# Patient Record
Sex: MALE
[De-identification: names, ages, dates, MRNs, and addresses within clinical notes are randomized per-mention and may not be internally consistent; named-entity substitution may affect disease eponyms.]

---

## 2018-06-20 ENCOUNTER — HOSPITAL ENCOUNTER (EMERGENCY)
Dept: HOSPITAL 42 - ED | Age: 23
Discharge: HOME | End: 2018-06-20
Payer: COMMERCIAL

## 2018-06-20 VITALS
TEMPERATURE: 98.7 F | SYSTOLIC BLOOD PRESSURE: 118 MMHG | DIASTOLIC BLOOD PRESSURE: 42 MMHG | OXYGEN SATURATION: 98 % | HEART RATE: 72 BPM | RESPIRATION RATE: 18 BRPM

## 2018-06-20 DIAGNOSIS — H66.92: Primary | ICD-10-CM

## 2018-06-20 NOTE — ED PDOC
Arrival/HPI





- General


Chief Complaint: ENT Problem


Time Seen by Provider: 06/20/18 17:55


Historian: Patient





- History of Present Illness


Narrative History of Present Illness (Text): 





06/20/18 17:56


pt p/w + 3-4 days onset of left ear pain, decreasing hearing; [place] the 

be[place] and swimming 4 days ago; pt states he has been using OTC ear wax removal 

with minimum improvement; pt also noted mild sore throat; pt denied fever/chills

/sweats, no chest pain/shortness of breath/palpitations, no abd pain, no n/v, 

no numbness/tingling, no dizziness/LOC, no neck pain, no headache, no ear 

discharge noted; pt denied Fall/trauma/sick contact, no travel; incidentally, 

pt states he has been having intermittent hoarse coughing x 2 weeks/with nasal 

congestion/post nasal drips; pt is here for further eval; pt's without other 

complaints





PCP: NONE








Time/Duration: < week (3-4 days)


Symptom Onset: Sudden


Symptom Course: Worsening


Severity Level: Severe


Activities at Onset: Rest


Context: Home





Past Medical History





- Provider Review


Nursing Documentation Reviewed: Yes





- Travel History


Have you recently traveled outside [place] w/in the past 3 mons?: No





- Past History


Past History: Non-Contributing





- Infectious Disease


Hx of Infectious Diseases: None





- Psychiatric


Hx Substance Use: Yes





Family/Social History





- Physician Review


Nursing Documentation Reviewed: Yes


Family/Social History: No Known Family HX


Smoking Status: Never Smoked


Hx Alcohol Use: No


Frequency of alcohol use: Socially


Hx Substance Use: Yes


Substance used: Marijuana


Hx Substance Use Treatment: No





Allergies/Home Meds


Allergies/Adverse Reactions: 


Allergies





No Known Allergies Allergy (Verified 06/20/18 17:21)


 











Review of Systems





- Review of Systems


Constitutional: Normal


Eyes: Normal


ENT: Hearing Changes, Other (left ear pain)


Respiratory: SOB, Cough.  absent: Sputum, Wheezing


Cardiovascular: Normal


Gastrointestinal: Normal.  absent: Nausea, Vomiting


Genitourinary Male: Normal


Musculoskeletal: Normal


Skin: Normal


Neurological: Normal


Endocrine: Normal


Hemo/Lymphatic: Normal


Psychiatric: Normal





Physical Exam





- Physical Exam


Narrative Physical Exam (Text): 





06/20/18 17:57


General: alert/awake, GCS = 15, oriented x 3, resting in bed, mildly 

uncomfortable, cooperative, interactive; NAD


Head: NC/AT


EYE: PERRLA, EOMI, sclera anicteric, no nystagmus, no photophobia; visual field 

intact b/l


Facial: WNL


ENT: difficult to visualize left TM, + cerumen with mild surrounding otic canal 

erythema, + tenderness noted on otic exam; right TM WNL, no bulging/retractions/

air-fluid level noted; no FB/masses noted; no mastoid tenderness/bogginess noted


Oral: uvula/tongue are midline, no exudate/lesions, no drooling/stridor, no 

dysphonia; intact dentitions; moist oral mucosa


NECK: intact ROM, no midline tenderness, no nuchal rigidity, no meningeal signs

; no step off


Chest: CTA b/l, no w/r/r; no tachypenia, no accessory muscle use noted


Cardiac: +S1, +S2, no m/r/r, no tachycardia


Abdominal: +BS, soft/nd/nt, well nourished patient; no masses/rebound/guarding/

rigidity; no aquino's sign, no mcburney's point tenderness


Extremities: intact ROM, strength 5/5 grossly intact in all limbs, neurovasc 

intact b/l; + ambulatory; reflex +2/2; no pitting edema/swelling b/l; no Zo'

s sign b/l


BACK: no step off, no midline tenderness, NO crepitus, no gross deformities 

noted; Intact ROM


SKIN: cap refill < 1 sec, no ulcerations, no petechiae, no rashes


NEURO: CNII-XII WNL, no facial asymmetries, no slurr speech, oriented x 3


NIH stroke scale ~ 0


Psych: normal insight, normal affect; follows command with ease





Vital Signs Reviewed: Yes





Vital Signs











  Temp Pulse Resp BP Pulse Ox


 


 06/20/18 17:21  98.7 F  72  18  118/42 L  98











Temperature: Afebrile


Blood Pressure: Normal


Pulse: Regular


Respiratory Rate: Normal


Appearance: Positive for: Well-Appearing, Non-Toxic, Uncomfortable.  No: Ill-

Appearing, Unkept


Pain Distress: None


Mental Status: Positive for: Alert and Oriented X 3





- Systems Exam


Head: Present: Atraumatic, Normocephalic





Medical Decision Making


ED Course and Treatment: 





06/20/18 17:58


Impression: left ear ache, otitis media


i have consider all the differential diagnosis regarding pt's chief medical 

complaints/clinical findings, including but are not limited to: left ear pain, 

otitis media





A/P: left ear pain, otitis media


- supportive care


- observe/reevaluation





06/20/18 18:10


pt is doing well


pt is not in any distress





vital signs are stable





pt is made aware of his medical results


pt is encouraged NOT TO SMOKE


pt is encouraged NOT TO SWIM or placed his head underneath water


pt will follow up as directed


pt will be discharged home








Re-evaluation Time: 17:59


Reassessment Condition: Unchanged





Disposition/Present on Arrival





- Present on Arrival


Any Indicators Present on Arrival: No


History of DVT/PE: No


History of Uncontrolled Diabetes: No


Urinary Catheter: No


History of Decub. Ulcer: No


History Surgical Site Infection Following: None





- Disposition


Have Diagnosis and Disposition been Completed?: Yes


Diagnosis: 


 Otitis media





Disposition: HOME/ ROUTINE


Disposition Time: 18:05


Patient Plan: Discharge


Patient Problems: 


 Current Active Problems











Problem Status Onset


 


Otitis media Acute  











Condition: STABLE


Discharge Instructions (ExitCare):  Ear Infections (Otitis Media)


Print Language: ENGLISH


Additional Instructions: 


Make sure to see your doctor in 1-2 days


DRINK PLENTY OF FLUIDS


DONT SWIM, DONT put your head underneath water until your left ear pain/

infection resolves


STOP SMOKING


DONT DO DRUGS


take your medications as prescribed


RETURN TO ED IF worse pain, cant breath, persistent vomiting, high fever >101-

102 for hours, altered behavior, slurr speech, facial changes, focal weakness (

arm/leg or both), unable to urinate, heavy/persistent bleeding, passing out, 

chest pain, or other medical emergencies


Prescriptions: 


Carbamide Peroxide [Debrox 15 Ml] 5 - 10 drop AS BID 5 Days #1 bottle


Ibuprofen [Motrin] 400 mg PO QID PRN #30 tab


 PRN Reason: Pain, Mild (1-3)


Ofloxacin Otic 0.3% [Floxin 0.3% Otic Soln] 10 drop AS BID 7 Days #1 bottle


Referrals: 


Henry Reese DO [[facility]] - Follow up with primary


Megha Syed MD [Staff Provider] - Follow up with primary


Forms:  [facility] Connect (English), WORK NOTE

## 2020-04-07 ENCOUNTER — TELEPHONE (OUTPATIENT)
Dept: INTERNAL MEDICINE CLINIC | Facility: CLINIC | Age: 25
End: 2020-04-07

## 2020-04-08 ENCOUNTER — TELEMEDICINE (OUTPATIENT)
Dept: INTERNAL MEDICINE CLINIC | Facility: CLINIC | Age: 25
End: 2020-04-08
Payer: COMMERCIAL

## 2020-04-08 DIAGNOSIS — Z86.2 HISTORY OF VON WILLEBRAND'S DISEASE: Primary | ICD-10-CM

## 2020-04-08 PROCEDURE — 99202 OFFICE O/P NEW SF 15 MIN: CPT | Performed by: HOSPITALIST

## 2021-08-21 ENCOUNTER — HOSPITAL ENCOUNTER (EMERGENCY)
Facility: HOSPITAL | Age: 26
Discharge: HOME | End: 2021-08-21
Attending: EMERGENCY MEDICINE

## 2021-08-21 ENCOUNTER — APPOINTMENT (EMERGENCY)
Dept: RADIOLOGY | Facility: HOSPITAL | Age: 26
End: 2021-08-21

## 2021-08-21 ENCOUNTER — APPOINTMENT (EMERGENCY)
Dept: CT IMAGING | Facility: HOSPITAL | Age: 26
End: 2021-08-21

## 2021-08-21 VITALS
OXYGEN SATURATION: 98 % | RESPIRATION RATE: 15 BRPM | TEMPERATURE: 98.6 F | WEIGHT: 253.53 LBS | HEART RATE: 78 BPM | DIASTOLIC BLOOD PRESSURE: 82 MMHG | SYSTOLIC BLOOD PRESSURE: 117 MMHG
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
## 2021-08-21 DIAGNOSIS — M54.9 BACK PAIN: ICD-10-CM

## 2021-08-21 PROCEDURE — 99284 EMERGENCY DEPT VISIT MOD MDM: CPT | Performed by: EMERGENCY MEDICINE

## 2021-08-21 PROCEDURE — 73130 X-RAY EXAM OF HAND: CPT

## 2021-08-21 PROCEDURE — 96374 THER/PROPH/DIAG INJ IV PUSH: CPT

## 2021-08-21 PROCEDURE — 96361 HYDRATE IV INFUSION ADD-ON: CPT

## 2021-08-21 PROCEDURE — 73090 X-RAY EXAM OF FOREARM: CPT

## 2021-08-21 PROCEDURE — 71250 CT THORAX DX C-: CPT

## 2021-08-21 PROCEDURE — 99284 EMERGENCY DEPT VISIT MOD MDM: CPT

## 2021-08-21 PROCEDURE — 74176 CT ABD & PELVIS W/O CONTRAST: CPT

## 2021-08-21 PROCEDURE — G1004 CDSM NDSC: HCPCS

## 2021-08-21 RX ORDER — NAPROXEN 500 MG/1
500 TABLET ORAL 2 TIMES DAILY WITH MEALS
Qty: 20 TABLET | Refills: 0 | Status: SHIPPED | OUTPATIENT
Start: 2021-08-21 | End: 2021-08-31

## 2021-08-21 RX ORDER — KETOROLAC TROMETHAMINE 30 MG/ML
15 INJECTION, SOLUTION INTRAMUSCULAR; INTRAVENOUS ONCE
Status: COMPLETED | OUTPATIENT
Start: 2021-08-21 | End: 2021-08-21

## 2021-08-21 RX ORDER — METHOCARBAMOL 500 MG/1
500 TABLET, FILM COATED ORAL 2 TIMES DAILY
Qty: 20 TABLET | Refills: 0 | Status: SHIPPED | OUTPATIENT
Start: 2021-08-21

## 2021-08-21 RX ADMIN — KETOROLAC TROMETHAMINE 15 MG: 30 INJECTION, SOLUTION INTRAMUSCULAR; INTRAVENOUS at 17:35

## 2021-08-21 RX ADMIN — SODIUM CHLORIDE 1000 ML: 0.9 INJECTION, SOLUTION INTRAVENOUS at 17:34

## 2021-08-21 NOTE — Clinical Note
Golden Quispe was seen and treated in our emergency department on 8/21/2021  Diagnosis:     Daniel Otto  may return to work on return date  He may return on this date: 08/23/2021         If you have any questions or concerns, please don't hesitate to call        Alissa Moreno MD    ______________________________           _______________          _______________  Hospital Representative                              Date                                Time
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
